# Patient Record
Sex: FEMALE | Race: BLACK OR AFRICAN AMERICAN | NOT HISPANIC OR LATINO | URBAN - METROPOLITAN AREA
[De-identification: names, ages, dates, MRNs, and addresses within clinical notes are randomized per-mention and may not be internally consistent; named-entity substitution may affect disease eponyms.]

---

## 2024-01-25 ENCOUNTER — HOSPITAL ENCOUNTER (INPATIENT)
Facility: HOSPITAL | Age: 79
LOS: 1 days | Discharge: HOME HEALTH | DRG: 311 | End: 2024-01-26
Attending: HOSPITALIST | Admitting: HOSPITALIST
Payer: MEDICARE

## 2024-01-25 DIAGNOSIS — I21.4 NSTEMI (NON-ST ELEVATED MYOCARDIAL INFARCTION): ICD-10-CM

## 2024-01-25 DIAGNOSIS — I44.1 AV BLOCK, MOBITZ 1: Primary | ICD-10-CM

## 2024-01-25 DIAGNOSIS — I45.9 HEART BLOCK: ICD-10-CM

## 2024-01-25 DIAGNOSIS — R07.9 CHEST PAIN: ICD-10-CM

## 2024-01-25 PROCEDURE — 11000001 HC ACUTE MED/SURG PRIVATE ROOM

## 2024-01-25 RX ORDER — GLUCAGON 1 MG
1 KIT INJECTION
Status: DISCONTINUED | OUTPATIENT
Start: 2024-01-25 | End: 2024-01-26 | Stop reason: HOSPADM

## 2024-01-25 RX ORDER — SODIUM CHLORIDE 0.9 % (FLUSH) 0.9 %
10 SYRINGE (ML) INJECTION EVERY 12 HOURS PRN
Status: DISCONTINUED | OUTPATIENT
Start: 2024-01-25 | End: 2024-01-26 | Stop reason: HOSPADM

## 2024-01-25 RX ORDER — IBUPROFEN 200 MG
16 TABLET ORAL
Status: DISCONTINUED | OUTPATIENT
Start: 2024-01-25 | End: 2024-01-26 | Stop reason: HOSPADM

## 2024-01-25 RX ORDER — MORPHINE SULFATE 2 MG/ML
2 INJECTION, SOLUTION INTRAMUSCULAR; INTRAVENOUS EVERY 4 HOURS PRN
Status: DISCONTINUED | OUTPATIENT
Start: 2024-01-25 | End: 2024-01-26 | Stop reason: HOSPADM

## 2024-01-25 RX ORDER — NALOXONE HCL 0.4 MG/ML
0.02 VIAL (ML) INJECTION
Status: DISCONTINUED | OUTPATIENT
Start: 2024-01-25 | End: 2024-01-26 | Stop reason: HOSPADM

## 2024-01-25 RX ORDER — IBUPROFEN 200 MG
24 TABLET ORAL
Status: DISCONTINUED | OUTPATIENT
Start: 2024-01-25 | End: 2024-01-26 | Stop reason: HOSPADM

## 2024-01-25 RX ORDER — ONDANSETRON HYDROCHLORIDE 2 MG/ML
4 INJECTION, SOLUTION INTRAVENOUS EVERY 8 HOURS PRN
Status: DISCONTINUED | OUTPATIENT
Start: 2024-01-25 | End: 2024-01-26 | Stop reason: HOSPADM

## 2024-01-25 RX ORDER — TALC
6 POWDER (GRAM) TOPICAL NIGHTLY PRN
Status: DISCONTINUED | OUTPATIENT
Start: 2024-01-25 | End: 2024-01-26 | Stop reason: HOSPADM

## 2024-01-26 VITALS
BODY MASS INDEX: 32.68 KG/M2 | SYSTOLIC BLOOD PRESSURE: 157 MMHG | HEIGHT: 60 IN | HEART RATE: 64 BPM | DIASTOLIC BLOOD PRESSURE: 70 MMHG | TEMPERATURE: 97 F | RESPIRATION RATE: 18 BRPM | OXYGEN SATURATION: 96 % | WEIGHT: 166.44 LBS

## 2024-01-26 DIAGNOSIS — Z00.00 ROUTINE ADULT HEALTH MAINTENANCE: ICD-10-CM

## 2024-01-26 DIAGNOSIS — Z76.89 ENCOUNTER TO ESTABLISH CARE: Primary | ICD-10-CM

## 2024-01-26 PROBLEM — N18.5 CKD (CHRONIC KIDNEY DISEASE) STAGE 5, GFR LESS THAN 15 ML/MIN: Status: ACTIVE | Noted: 2024-01-26

## 2024-01-26 PROBLEM — E78.5 HYPERLIPIDEMIA: Status: ACTIVE | Noted: 2024-01-26

## 2024-01-26 PROBLEM — R79.89 ELEVATED TROPONIN LEVEL: Status: ACTIVE | Noted: 2024-01-26

## 2024-01-26 PROBLEM — I10 HTN (HYPERTENSION): Status: ACTIVE | Noted: 2024-01-26

## 2024-01-26 PROBLEM — F03.90 DEMENTIA: Status: ACTIVE | Noted: 2024-01-26

## 2024-01-26 PROBLEM — I44.1 AV BLOCK, MOBITZ 1: Status: ACTIVE | Noted: 2024-01-26

## 2024-01-26 PROBLEM — M10.9 GOUT: Status: ACTIVE | Noted: 2024-01-26

## 2024-01-26 PROBLEM — F03.90 DEMENTIA: Status: RESOLVED | Noted: 2024-01-26 | Resolved: 2024-01-26

## 2024-01-26 LAB
ANION GAP SERPL CALC-SCNC: 12 MMOL/L (ref 8–16)
BASOPHILS # BLD AUTO: 0.02 K/UL (ref 0–0.2)
BASOPHILS NFR BLD: 0.3 % (ref 0–1.9)
BUN SERPL-MCNC: 52 MG/DL (ref 8–23)
CALCIUM SERPL-MCNC: 9.5 MG/DL (ref 8.7–10.5)
CHLORIDE SERPL-SCNC: 105 MMOL/L (ref 95–110)
CO2 SERPL-SCNC: 26 MMOL/L (ref 23–29)
CREAT SERPL-MCNC: 4 MG/DL (ref 0.5–1.4)
DIFFERENTIAL METHOD BLD: ABNORMAL
EOSINOPHIL # BLD AUTO: 0.1 K/UL (ref 0–0.5)
EOSINOPHIL NFR BLD: 1.7 % (ref 0–8)
ERYTHROCYTE [DISTWIDTH] IN BLOOD BY AUTOMATED COUNT: 14.9 % (ref 11.5–14.5)
EST. GFR  (NO RACE VARIABLE): 11 ML/MIN/1.73 M^2
GLUCOSE SERPL-MCNC: 124 MG/DL (ref 70–110)
HCT VFR BLD AUTO: 31.1 % (ref 37–48.5)
HGB BLD-MCNC: 10.3 G/DL (ref 12–16)
IMM GRANULOCYTES # BLD AUTO: 0.07 K/UL (ref 0–0.04)
IMM GRANULOCYTES NFR BLD AUTO: 0.9 % (ref 0–0.5)
LYMPHOCYTES # BLD AUTO: 1.3 K/UL (ref 1–4.8)
LYMPHOCYTES NFR BLD: 17.7 % (ref 18–48)
MCH RBC QN AUTO: 30.1 PG (ref 27–31)
MCHC RBC AUTO-ENTMCNC: 33.1 G/DL (ref 32–36)
MCV RBC AUTO: 91 FL (ref 82–98)
MONOCYTES # BLD AUTO: 0.7 K/UL (ref 0.3–1)
MONOCYTES NFR BLD: 9.9 % (ref 4–15)
NEUTROPHILS # BLD AUTO: 5.2 K/UL (ref 1.8–7.7)
NEUTROPHILS NFR BLD: 69.5 % (ref 38–73)
NRBC BLD-RTO: 0 /100 WBC
PLATELET # BLD AUTO: 194 K/UL (ref 150–450)
PMV BLD AUTO: 10.6 FL (ref 9.2–12.9)
POCT GLUCOSE: 111 MG/DL (ref 70–110)
POTASSIUM SERPL-SCNC: 4.2 MMOL/L (ref 3.5–5.1)
RBC # BLD AUTO: 3.42 M/UL (ref 4–5.4)
SODIUM SERPL-SCNC: 143 MMOL/L (ref 136–145)
TROPONIN I SERPL DL<=0.01 NG/ML-MCNC: 0.11 NG/ML (ref 0–0.03)
WBC # BLD AUTO: 7.51 K/UL (ref 3.9–12.7)

## 2024-01-26 PROCEDURE — 25000003 PHARM REV CODE 250: Performed by: STUDENT IN AN ORGANIZED HEALTH CARE EDUCATION/TRAINING PROGRAM

## 2024-01-26 PROCEDURE — 93005 ELECTROCARDIOGRAM TRACING: CPT

## 2024-01-26 PROCEDURE — 99223 1ST HOSP IP/OBS HIGH 75: CPT | Mod: ,,, | Performed by: STUDENT IN AN ORGANIZED HEALTH CARE EDUCATION/TRAINING PROGRAM

## 2024-01-26 PROCEDURE — 36415 COLL VENOUS BLD VENIPUNCTURE: CPT | Mod: XB | Performed by: NURSE PRACTITIONER

## 2024-01-26 PROCEDURE — 85025 COMPLETE CBC W/AUTO DIFF WBC: CPT | Performed by: STUDENT IN AN ORGANIZED HEALTH CARE EDUCATION/TRAINING PROGRAM

## 2024-01-26 PROCEDURE — 84484 ASSAY OF TROPONIN QUANT: CPT | Performed by: NURSE PRACTITIONER

## 2024-01-26 PROCEDURE — 36415 COLL VENOUS BLD VENIPUNCTURE: CPT | Performed by: STUDENT IN AN ORGANIZED HEALTH CARE EDUCATION/TRAINING PROGRAM

## 2024-01-26 PROCEDURE — 80048 BASIC METABOLIC PNL TOTAL CA: CPT | Performed by: STUDENT IN AN ORGANIZED HEALTH CARE EDUCATION/TRAINING PROGRAM

## 2024-01-26 PROCEDURE — 93010 ELECTROCARDIOGRAM REPORT: CPT | Mod: ,,, | Performed by: INTERNAL MEDICINE

## 2024-01-26 RX ORDER — MEMANTINE HYDROCHLORIDE 5 MG/1
10 TABLET ORAL DAILY
Status: DISCONTINUED | OUTPATIENT
Start: 2024-01-26 | End: 2024-01-26 | Stop reason: HOSPADM

## 2024-01-26 RX ORDER — SUCRALFATE 1 G/10ML
1 SUSPENSION ORAL EVERY 6 HOURS
Status: DISCONTINUED | OUTPATIENT
Start: 2024-01-26 | End: 2024-01-26 | Stop reason: HOSPADM

## 2024-01-26 RX ORDER — AMLODIPINE BESYLATE 5 MG/1
5 TABLET ORAL DAILY
Status: DISCONTINUED | OUTPATIENT
Start: 2024-01-26 | End: 2024-01-26 | Stop reason: HOSPADM

## 2024-01-26 RX ORDER — CETIRIZINE HYDROCHLORIDE 10 MG/1
10 TABLET ORAL EVERY MORNING
Status: DISCONTINUED | OUTPATIENT
Start: 2024-01-26 | End: 2024-01-26 | Stop reason: HOSPADM

## 2024-01-26 RX ORDER — BUMETANIDE 1 MG/1
1 TABLET ORAL DAILY
Status: DISCONTINUED | OUTPATIENT
Start: 2024-01-26 | End: 2024-01-26 | Stop reason: HOSPADM

## 2024-01-26 RX ORDER — ESCITALOPRAM OXALATE 5 MG/1
5 TABLET ORAL DAILY
Status: DISCONTINUED | OUTPATIENT
Start: 2024-01-26 | End: 2024-01-26 | Stop reason: HOSPADM

## 2024-01-26 RX ORDER — METOPROLOL SUCCINATE 50 MG/1
100 TABLET, EXTENDED RELEASE ORAL DAILY
Status: DISCONTINUED | OUTPATIENT
Start: 2024-01-26 | End: 2024-01-26

## 2024-01-26 RX ORDER — PANTOPRAZOLE SODIUM 40 MG/1
40 TABLET, DELAYED RELEASE ORAL DAILY
Status: DISCONTINUED | OUTPATIENT
Start: 2024-01-26 | End: 2024-01-26 | Stop reason: HOSPADM

## 2024-01-26 RX ORDER — ALLOPURINOL 100 MG/1
100 TABLET ORAL DAILY
Status: DISCONTINUED | OUTPATIENT
Start: 2024-01-26 | End: 2024-01-26 | Stop reason: HOSPADM

## 2024-01-26 RX ORDER — OXYBUTYNIN CHLORIDE 5 MG/1
10 TABLET, EXTENDED RELEASE ORAL DAILY
Status: DISCONTINUED | OUTPATIENT
Start: 2024-01-26 | End: 2024-01-26 | Stop reason: HOSPADM

## 2024-01-26 RX ORDER — ATORVASTATIN CALCIUM 40 MG/1
80 TABLET, FILM COATED ORAL DAILY
Status: DISCONTINUED | OUTPATIENT
Start: 2024-01-26 | End: 2024-01-26 | Stop reason: HOSPADM

## 2024-01-26 RX ORDER — ALUMINUM HYDROXIDE, MAGNESIUM HYDROXIDE, AND SIMETHICONE 1200; 120; 1200 MG/30ML; MG/30ML; MG/30ML
30 SUSPENSION ORAL
Status: DISCONTINUED | OUTPATIENT
Start: 2024-01-26 | End: 2024-01-26 | Stop reason: HOSPADM

## 2024-01-26 RX ADMIN — ALLOPURINOL 100 MG: 100 TABLET ORAL at 09:01

## 2024-01-26 RX ADMIN — CETIRIZINE HYDROCHLORIDE 10 MG: 10 TABLET, FILM COATED ORAL at 09:01

## 2024-01-26 RX ADMIN — METOPROLOL SUCCINATE 100 MG: 50 TABLET, EXTENDED RELEASE ORAL at 09:01

## 2024-01-26 RX ADMIN — MEMANTINE HYDROCHLORIDE 10 MG: 5 TABLET ORAL at 09:01

## 2024-01-26 RX ADMIN — OXYBUTYNIN CHLORIDE 10 MG: 5 TABLET, EXTENDED RELEASE ORAL at 09:01

## 2024-01-26 RX ADMIN — AMLODIPINE BESYLATE 5 MG: 5 TABLET ORAL at 09:01

## 2024-01-26 RX ADMIN — ATORVASTATIN CALCIUM 80 MG: 40 TABLET, FILM COATED ORAL at 09:01

## 2024-01-26 RX ADMIN — ESCITALOPRAM OXALATE 5 MG: 5 TABLET, FILM COATED ORAL at 09:01

## 2024-01-26 RX ADMIN — PANTOPRAZOLE SODIUM 40 MG: 40 TABLET, DELAYED RELEASE ORAL at 09:01

## 2024-01-26 RX ADMIN — BUMETANIDE 1 MG: 1 TABLET ORAL at 09:01

## 2024-01-26 NOTE — SUBJECTIVE & OBJECTIVE
Interval History:   No acute events  Denies cp/sob  No f/c/n/v  No complaints and eager to go home    Review of Systems   Constitutional:  Negative for chills and fever.   Respiratory:  Negative for cough, shortness of breath and wheezing.    Cardiovascular:  Negative for chest pain, palpitations and leg swelling.   Gastrointestinal:  Negative for abdominal pain, diarrhea and nausea.   Neurological:  Negative for dizziness, light-headedness and headaches.   Psychiatric/Behavioral:  Negative for confusion.      Objective:     Vital Signs (Most Recent):  Temp: 97 °F (36.1 °C) (01/26/24 1153)  Pulse: 64 (01/26/24 1153)  Resp: 18 (01/26/24 1153)  BP: (!) 157/70 (01/26/24 1153)  SpO2: 96 % (01/26/24 1153) Vital Signs (24h Range):  Temp:  [96.8 °F (36 °C)-98.1 °F (36.7 °C)] 97 °F (36.1 °C)  Pulse:  [60-64] 64  Resp:  [16-18] 18  SpO2:  [96 %-99 %] 96 %  BP: (157-181)/(70-77) 157/70     Weight: 75.5 kg (166 lb 7.2 oz)  Body mass index is 32.51 kg/m².    Intake/Output Summary (Last 24 hours) at 1/26/2024 1313  Last data filed at 1/26/2024 1247  Gross per 24 hour   Intake --   Output 1250 ml   Net -1250 ml         Physical Exam  Constitutional:       Appearance: Normal appearance.   HENT:      Mouth/Throat:      Mouth: Mucous membranes are moist.      Pharynx: Oropharynx is clear.   Eyes:      Conjunctiva/sclera: Conjunctivae normal.   Cardiovascular:      Rate and Rhythm: Normal rate and regular rhythm.      Pulses: Normal pulses.      Heart sounds: Normal heart sounds.   Pulmonary:      Effort: Pulmonary effort is normal.      Breath sounds: Normal breath sounds.   Abdominal:      Palpations: Abdomen is soft.   Musculoskeletal:         General: Normal range of motion.   Skin:     General: Skin is warm and dry.   Neurological:      General: No focal deficit present.      Mental Status: She is alert and oriented to person, place, and time.   Psychiatric:         Mood and Affect: Mood normal.         Behavior: Behavior  normal.             Significant Labs: All pertinent labs within the past 24 hours have been reviewed.    Significant Imaging: I have reviewed all pertinent imaging results/findings within the past 24 hours.

## 2024-01-26 NOTE — SUBJECTIVE & OBJECTIVE
No past medical history on file.    No past surgical history on file.    Review of patient's allergies indicates:   Allergen Reactions    Amoxicillin Rash    Ferrous sulfate Other (See Comments)     Constipation       No current facility-administered medications on file prior to encounter.     Current Outpatient Medications on File Prior to Encounter   Medication Sig    amLODIPine (NORVASC) 5 MG tablet Take 5 mg by mouth.    atorvastatin (LIPITOR) 80 MG tablet Take 1 tablet by mouth once daily.    bumetanide (BUMEX) 1 MG tablet Take 1 mg by mouth.    cetirizine (ZYRTEC) 10 MG tablet Take 1 tablet by mouth every morning.    ergocalciferol (ERGOCALCIFEROL) 50,000 unit Cap Take 50,000 Units by mouth.    EScitalopram oxalate (LEXAPRO) 5 MG Tab Take 1 tablet by mouth once daily.    febuxostat (ULORIC) 40 mg Tab Take 40 mg by mouth once daily.    hydrALAZINE (APRESOLINE) 25 MG tablet Take 75 mg by mouth.    memantine (NAMENDA) 10 MG Tab Take 10 mg by mouth.    metoprolol succinate (TOPROL-XL) 50 MG 24 hr tablet Take 2 tablets by mouth once daily.    omeprazole (PRILOSEC) 20 MG capsule Take 1 capsule by mouth once daily.    pediatric multivitamin chewable tablet Take 1 capsule by mouth once daily.    trospium (SANCTURA) 20 mg Tab tablet Take 20 mg by mouth 2 (two) times daily.     Family History    None       Tobacco Use    Smoking status: Not on file    Smokeless tobacco: Not on file   Substance and Sexual Activity    Alcohol use: Not on file    Drug use: Not on file    Sexual activity: Not on file     Review of Systems   Constitutional:  Negative for appetite change and chills.   HENT:  Negative for ear discharge and ear pain.    Respiratory:  Negative for cough and choking.    Cardiovascular:  Negative for chest pain and leg swelling.   Gastrointestinal:  Negative for anal bleeding and blood in stool.   Endocrine: Negative for polydipsia and polyphagia.   Genitourinary:  Negative for flank pain and hematuria.    Musculoskeletal:  Negative for back pain and gait problem.   Skin:  Negative for pallor and rash.   Allergic/Immunologic: Negative for food allergies and immunocompromised state.   Neurological:  Negative for seizures and speech difficulty.   Hematological:  Negative for adenopathy. Does not bruise/bleed easily.   Psychiatric/Behavioral:  Negative for decreased concentration and dysphoric mood.      Objective:     Vital Signs (Most Recent):  Temp: 97.5 °F (36.4 °C) (01/25/24 2225)  Pulse: 63 (01/25/24 2358)  Resp: 16 (01/25/24 2225)  BP: (!) 168/71 (01/25/24 2225)  SpO2: 98 % (01/25/24 2225) Vital Signs (24h Range):  Temp:  [97.5 °F (36.4 °C)] 97.5 °F (36.4 °C)  Pulse:  [62-63] 63  Resp:  [16] 16  SpO2:  [98 %] 98 %  BP: (168)/(71) 168/71     Weight: 74.4 kg (164 lb 0.4 oz)  Body mass index is 32.03 kg/m².     Physical Exam  Vitals reviewed.   Constitutional:       General: She is not in acute distress.     Appearance: She is obese. She is not toxic-appearing.   HENT:      Right Ear: There is no impacted cerumen.      Left Ear: There is no impacted cerumen.      Nose: No congestion or rhinorrhea.      Mouth/Throat:      Pharynx: No oropharyngeal exudate or posterior oropharyngeal erythema.   Eyes:      General:         Right eye: No discharge.         Left eye: No discharge.   Cardiovascular:      Rate and Rhythm: Normal rate.      Heart sounds: No murmur heard.     No gallop.   Pulmonary:      Breath sounds: No wheezing or rales.   Abdominal:      General: There is no distension.      Tenderness: There is no abdominal tenderness.   Musculoskeletal:         General: No swelling or deformity.      Cervical back: No rigidity.      Comments: Fistula seen    Lymphadenopathy:      Cervical: No cervical adenopathy.   Skin:     Coloration: Skin is not jaundiced.      Findings: No bruising.   Neurological:      General: No focal deficit present.      Cranial Nerves: No cranial nerve deficit.      Motor: No weakness.  "  Psychiatric:         Mood and Affect: Mood normal.                Significant Labs: All pertinent labs within the past 24 hours have been reviewed.  BMP: No results for input(s): "GLU", "NA", "K", "CL", "CO2", "BUN", "CREATININE", "CALCIUM", "MG" in the last 48 hours.  CBC: No results for input(s): "WBC", "HGB", "HCT", "PLT" in the last 48 hours.  CMP: No results for input(s): "NA", "K", "CL", "CO2", "GLU", "BUN", "CREATININE", "CALCIUM", "PROT", "ALBUMIN", "BILITOT", "ALKPHOS", "AST", "ALT", "ANIONGAP", "EGFRNONAA" in the last 48 hours.    Invalid input(s): "ESTGFAFRICA"  Troponin: No results for input(s): "TROPONINI", "TROPONINIHS" in the last 48 hours.  TSH:   Recent Labs   Lab 09/20/23  1451   TSH 0.85     Urine Culture: No results for input(s): "LABURIN" in the last 48 hours.    Significant Imaging: I have reviewed all pertinent imaging results/findings within the past 24 hours.  I have reviewed and interpreted all pertinent imaging results/findings within the past 24 hours.  "

## 2024-01-26 NOTE — SUBJECTIVE & OBJECTIVE
No past medical history on file.    No past surgical history on file.    Review of patient's allergies indicates:   Allergen Reactions    Amoxicillin Rash    Ferrous sulfate Other (See Comments)     Constipation       No current facility-administered medications on file prior to encounter.     Current Outpatient Medications on File Prior to Encounter   Medication Sig    amLODIPine (NORVASC) 5 MG tablet Take 5 mg by mouth.    atorvastatin (LIPITOR) 80 MG tablet Take 1 tablet by mouth once daily.    bumetanide (BUMEX) 1 MG tablet Take 1 mg by mouth.    cetirizine (ZYRTEC) 10 MG tablet Take 1 tablet by mouth every morning.    ergocalciferol (ERGOCALCIFEROL) 50,000 unit Cap Take 50,000 Units by mouth.    EScitalopram oxalate (LEXAPRO) 5 MG Tab Take 1 tablet by mouth once daily.    febuxostat (ULORIC) 40 mg Tab Take 40 mg by mouth once daily.    hydrALAZINE (APRESOLINE) 25 MG tablet Take 75 mg by mouth.    memantine (NAMENDA) 10 MG Tab Take 10 mg by mouth.    metoprolol succinate (TOPROL-XL) 50 MG 24 hr tablet Take 2 tablets by mouth once daily.    omeprazole (PRILOSEC) 20 MG capsule Take 1 capsule by mouth once daily.    pediatric multivitamin chewable tablet Take 1 capsule by mouth once daily.    trospium (SANCTURA) 20 mg Tab tablet Take 20 mg by mouth 2 (two) times daily.     Family History    None       Tobacco Use    Smoking status: Not on file    Smokeless tobacco: Not on file   Substance and Sexual Activity    Alcohol use: Not on file    Drug use: Not on file    Sexual activity: Not on file     Review of Systems   Constitutional: Negative. Negative for diaphoresis.   HENT: Negative.     Eyes: Negative.    Cardiovascular:  Positive for chest pain (x 1 mo, resolved PTA). Negative for irregular heartbeat, leg swelling, near-syncope, orthopnea, palpitations, paroxysmal nocturnal dyspnea and syncope.   Respiratory: Negative.  Negative for cough and shortness of breath.    Endocrine: Negative.    Hematologic/Lymphatic:  Negative.    Gastrointestinal:  Negative for nausea and vomiting.   Genitourinary: Negative.    Neurological:  Positive for weakness.   Psychiatric/Behavioral:  Positive for memory loss.    Allergic/Immunologic: Negative.      Objective:     Vital Signs (Most Recent):  Temp: 96.8 °F (36 °C) (01/26/24 0751)  Pulse: 62 (01/26/24 0751)  Resp: 18 (01/26/24 0751)  BP: (!) 181/77 (01/26/24 0751)  SpO2: 96 % (01/26/24 0751) Vital Signs (24h Range):  Temp:  [96.8 °F (36 °C)-98.1 °F (36.7 °C)] 96.8 °F (36 °C)  Pulse:  [60-63] 62  Resp:  [16-18] 18  SpO2:  [96 %-99 %] 96 %  BP: (168-181)/(71-77) 181/77     Weight: 75.5 kg (166 lb 7.2 oz)  Body mass index is 32.51 kg/m².    SpO2: 96 %         Intake/Output Summary (Last 24 hours) at 1/26/2024 1006  Last data filed at 1/26/2024 0520  Gross per 24 hour   Intake --   Output 500 ml   Net -500 ml       Lines/Drains/Airways       None                    Physical Exam  Constitutional:       General: She is not in acute distress.     Appearance: She is not diaphoretic.   HENT:      Head: Atraumatic.   Eyes:      General:         Right eye: No discharge.         Left eye: No discharge.   Cardiovascular:      Rate and Rhythm: Bradycardia present.   Pulmonary:      Effort: Pulmonary effort is normal.      Breath sounds: No rales.   Abdominal:      General: Bowel sounds are normal.      Palpations: Abdomen is soft.   Musculoskeletal:      Right lower leg: No edema.      Left lower leg: No edema.   Skin:     General: Skin is warm and dry.      Capillary Refill: Capillary refill takes 2 to 3 seconds.   Neurological:      Mental Status: She is alert. Mental status is at baseline.          Significant Labs: BMP:   Recent Labs   Lab 01/26/24  0247   *      K 4.2      CO2 26   BUN 52*   CREATININE 4.0*   CALCIUM 9.5   , CMP   Recent Labs   Lab 01/26/24  0247      K 4.2      CO2 26   *   BUN 52*   CREATININE 4.0*   CALCIUM 9.5   ANIONGAP 12   , CBC   Recent  "Labs   Lab 01/26/24  0247   WBC 7.51   HGB 10.3*   HCT 31.1*      , INR No results for input(s): "INR", "PROTIME" in the last 48 hours., Lipid Panel No results for input(s): "CHOL", "HDL", "LDLCALC", "TRIG", "CHOLHDL" in the last 48 hours., Troponin   Recent Labs   Lab 01/26/24  0744   TROPONINI 0.112*   , and All pertinent lab results from the last 24 hours have been reviewed.    Significant Imaging: Echocardiogram: Transthoracic echo (TTE) complete (Cupid Only): No results found for this or any previous visit.  "

## 2024-01-26 NOTE — CONSULTS
[facility] - Telemetry  Cardiology  Consult Note    Patient Name: Kay Ahumada  MRN: [id number]  Admission Date: 1/25/2024  Hospital Length of Stay: 1 days  Code Status: Full Code   Attending Provider: Juliano Parson MD   Consulting Provider: Charly Mckeon NP  Primary Care Physician: Juliano Parson MD  Principal Problem:Elevated troponin level    Patient information was obtained from patient and ER records.     Inpatient consult to [facility]iology[facility]  Consult performed by: Charly Mckeon NP  Consult ordered by: Bruce Rivera MD        Subjective:     Chief Complaint:  weakness, fatigue, UTI     HPI:   Kay Ahumada is a 78 yr old female with  CKD, hypertension, type 2 diabetes that presented to an outside facility with generalized weakness fatigue on 01/22/2024. Patient also reports CP x 1 month. She is a poor historian and is unable to elaborate. Per  her HS troponin was in the 100s which converts to T of 0.1. EKG was SR without acute ischemic changes. TTE with normal LVEF and no significant WMA. PFO noted without any pulmonary venous flow. Repeat troponin 0.1 this AM. Cr 4 up from 3.5- she has not been declared ESRD. Patient noted to have UTI. Outside records are not available. Patient was transferred to [facility] for cardiology and nephrology consultation. She denies any chest pain at this time.     No past medical history on file.    No past surgical history on file.    Review of patient's allergies indicates:   Allergen Reactions    Amoxicillin Rash    Ferrous sulfate Other (See Comments)     Constipation       No current facility-administered medications on file prior to encounter.     Current Outpatient Medications on File Prior to Encounter   Medication Sig    amLODIPine (NORVASC) 5 MG tablet Take 5 mg by mouth.    atorvastatin (LIPITOR) 80 MG tablet Take 1 tablet by mouth once daily.    bumetanide (BUMEX) 1 MG tablet Take 1 mg by mouth.    cetirizine (ZYRTEC) 10 MG tablet Take 1  tablet by mouth every morning.    ergocalciferol (ERGOCALCIFEROL) 50,000 unit Cap Take 50,000 Units by mouth.    EScitalopram oxalate (LEXAPRO) 5 MG Tab Take 1 tablet by mouth once daily.    febuxostat (ULORIC) 40 mg Tab Take 40 mg by mouth once daily.    hydrALAZINE (APRESOLINE) 25 MG tablet Take 75 mg by mouth.    memantine (NAMENDA) 10 MG Tab Take 10 mg by mouth.    metoprolol succinate (TOPROL-XL) 50 MG 24 hr tablet Take 2 tablets by mouth once daily.    omeprazole (PRILOSEC) 20 MG capsule Take 1 capsule by mouth once daily.    pediatric multivitamin chewable tablet Take 1 capsule by mouth once daily.    trospium (SANCTURA) 20 mg Tab tablet Take 20 mg by mouth 2 (two) times daily.     Family History    None       Tobacco Use    Smoking status: Not on file    Smokeless tobacco: Not on file   Substance and Sexual Activity    Alcohol use: Not on file    Drug use: Not on file    Sexual activity: Not on file     Review of Systems   Constitutional: Negative. Negative for diaphoresis.   HENT: Negative.     Eyes: Negative.    Cardiovascular:  Positive for chest pain (x 1 mo, resolved PTA). Negative for irregular heartbeat, leg swelling, near-syncope, orthopnea, palpitations, paroxysmal nocturnal dyspnea and syncope.   Respiratory: Negative.  Negative for cough and shortness of breath.    Endocrine: Negative.    Hematologic/Lymphatic: Negative.    Gastrointestinal:  Negative for nausea and vomiting.   Genitourinary: Negative.    Neurological:  Positive for weakness.   Psychiatric/Behavioral:  Positive for memory loss.    Allergic/Immunologic: Negative.      Objective:     Vital Signs (Most Recent):  Temp: 96.8 °F (36 °C) (01/26/24 0751)  Pulse: 62 (01/26/24 0751)  Resp: 18 (01/26/24 0751)  BP: (!) 181/77 (01/26/24 0751)  SpO2: 96 % (01/26/24 0751) Vital Signs (24h Range):  Temp:  [96.8 °F (36 °C)-98.1 °F (36.7 °C)] 96.8 °F (36 °C)  Pulse:  [60-63] 62  Resp:  [16-18] 18  SpO2:  [96 %-99 %] 96 %  BP: (168-181)/(71-77)  "181/77     Weight: 75.5 kg (166 lb 7.2 oz)  Body mass index is 32.51 kg/m².    SpO2: 96 %         Intake/Output Summary (Last 24 hours) at 1/26/2024 1006  Last data filed at 1/26/2024 0520  Gross per 24 hour   Intake --   Output 500 ml   Net -500 ml       Lines/Drains/Airways       None                    Physical Exam  Constitutional:       General: She is not in acute distress.     Appearance: She is not diaphoretic.   HENT:      Head: Atraumatic.   Eyes:      General:         Right eye: No discharge.         Left eye: No discharge.   Cardiovascular:      Rate and Rhythm: Bradycardia present.   Pulmonary:      Effort: Pulmonary effort is normal.      Breath sounds: No rales.   Abdominal:      General: Bowel sounds are normal.      Palpations: Abdomen is soft.   Musculoskeletal:      Right lower leg: No edema.      Left lower leg: No edema.   Skin:     General: Skin is warm and dry.      Capillary Refill: Capillary refill takes 2 to 3 seconds.   Neurological:      Mental Status: She is alert. Mental status is at baseline.          Significant Labs: BMP:   Recent Labs   Lab 01/26/24  0247   *      K 4.2      CO2 26   BUN 52*   CREATININE 4.0*   CALCIUM 9.5   , CMP   Recent Labs   Lab 01/26/24  0247      K 4.2      CO2 26   *   BUN 52*   CREATININE 4.0*   CALCIUM 9.5   ANIONGAP 12   , CBC   Recent Labs   Lab 01/26/24  0247   WBC 7.51   HGB 10.3*   HCT 31.1*      , INR No results for input(s): "INR", "PROTIME" in the last 48 hours., Lipid Panel No results for input(s): "CHOL", "HDL", "LDLCALC", "TRIG", "CHOLHDL" in the last 48 hours., Troponin   Recent Labs   Lab 01/26/24  0744   TROPONINI 0.112*   , and All pertinent lab results from the last 24 hours have been reviewed.    Significant Imaging: Echocardiogram: Transthoracic echo (TTE) complete (Cupid Only): No results found for this or any previous visit.  Assessment and Plan:     * Elevated troponin level  Troponin 0.1 x " 2- flat non ACS trend  EKG SR without acute ischemic changes  TTE with normal LVEF and no WMA  No CP  Felt to be demand in setting of UTI and NADYA on CKD with Cr up to 4 this AM and poorly controlled HTN  Feel no further inpatient work up indicated at this time  Follow up with primary cardiologist     AV block, Mobitz 1  Patient with intermittent Mobitz 1 on tele  DCd BB  30 day event monitor with primary cardiologist as OP     HTN (hypertension)  SBP elevated  Continue Norvasc and up titrate PRN  BB discontinued- Mobitz 1 on tele review     Hyperlipidemia  Continue high intensity statin     CKD (chronic kidney disease) stage 5, GFR less than 15 ml/min  Cr up to 4 from 3.5 yesterday   Nephrology consult pending   Patient has AVF - has not been declared ESRD at this time          VTE Risk Mitigation (From admission, onward)           Ordered     IP VTE LOW RISK PATIENT  Once         01/25/24 2203     Place sequential compression device  Until discontinued         01/25/24 2203                    Thank you for your consult. I will sign off. Please contact us if you have any additional questions.    Charly Mckeon NP  Cardiology   [facility] - Telemetry

## 2024-01-26 NOTE — ASSESSMENT & PLAN NOTE
Patient noted to have significant Troponinemia at outside facility.  EKG with normal sinus rhythm  TTE showed LVEF 55-60%    Plan:  Cardiology consultation   NPO   Possible angiogram in the a.m.

## 2024-01-26 NOTE — HPI
Kay Ahumada is a 78 yr old female with PMH of of CKD, hypertension, type 2 diabetes that presented with generalized weakness fatigue to an outside hospital, found to have significantly elevated troponinemia and transferred to [facility] for cardiology and nephrology consultation.    Patient was noted to have significant troponinemia, and patient was started on heparin drip.  Patient was transitioned to Lovenox.  TTE performed showed preserved LVEF 55-60%.  Patient denies any current chest pain.  EKG with normal sinus rhythm.  Cardiology has recommended angiogram for consideration of HD.     At the outside facility, patient was found to have UTI, and she was started on Rocephin. She has completed 2-3 days. No current complaints of dysuria.    Patient has a functioning left upper extremity fistula.  She was yet to start dialysis with it.    Pt admitted for further workup.

## 2024-01-26 NOTE — PLAN OF CARE
Problem: Adult Inpatient Plan of Care  Goal: Plan of Care Review  Outcome: Ongoing, Progressing  Goal: Absence of Hospital-Acquired Illness or Injury  Outcome: Ongoing, Progressing     Problem: Skin Injury Risk Increased  Goal: Skin Health and Integrity  Outcome: Ongoing, Progressing     Problem: Fall Injury Risk  Goal: Absence of Fall and Fall-Related Injury  Outcome: Ongoing, Progressing     Problem: Renal Function Impairment (Chronic Kidney Disease)  Goal: Minimize Renal Failure Effects  Outcome: Ongoing, Progressing

## 2024-01-26 NOTE — ASSESSMENT & PLAN NOTE
Chronic, controlled. Latest blood pressure and vitals reviewed-     Temp:  [96.8 °F (36 °C)-98.1 °F (36.7 °C)]   Pulse:  [60-64]   Resp:  [16-18]   BP: (157-181)/(70-77)   SpO2:  [96 %-99 %] .   Home meds for hypertension were reviewed and noted below.   Hypertension Medications               amLODIPine (NORVASC) 5 MG tablet Take 5 mg by mouth.    bumetanide (BUMEX) 1 MG tablet Take 1 mg by mouth.    hydrALAZINE (APRESOLINE) 25 MG tablet Take 75 mg by mouth.    metoprolol succinate (TOPROL-XL) 50 MG 24 hr tablet Take 2 tablets by mouth once daily.            While in the hospital, will manage blood pressure as follows; Continue home antihypertensive regimen    Will utilize p.r.n. blood pressure medication only if patient's blood pressure greater than 180/110 and she develops symptoms such as worsening chest pain or shortness of breath.

## 2024-01-26 NOTE — ASSESSMENT & PLAN NOTE
Patient with intermittent Mobitz 1 on tele  DCd BB  30 day event monitor with primary cardiologist as OP

## 2024-01-26 NOTE — ASSESSMENT & PLAN NOTE
Cr up to 4 from 3.5 yesterday   Nephrology consult pending   Patient has AVF - has not been declared ESRD at this time

## 2024-01-26 NOTE — ASSESSMENT & PLAN NOTE
"Patient noted to have significant Troponinemia at outside facility.  EKG with normal sinus rhythm  TTE showed LVEF 55-60%    Plan:  Cardiology consultation: "[place] to be demand in setting of UTI and NADYA on CKD with Cr up to 4 this AM and poorly controlled HTN  Feel no further inpatient work up indicated at this time  Follow up with primary cardiologist "    Patient was discharged home in stable condition    "

## 2024-01-26 NOTE — PROGRESS NOTES
VN cued into room to complete admit assessment. VIP model introduced; VN working alongside bedside treatment team.  Plan of care reviewed with patient. Patient informed of fall risk, fall precautions, call light within reach, side rails x2 elevated. Patient notified to ask staff for assistance. Patient verbalized complete understanding. Time allowed for questions. Will continue to monitor and intervene as needed.   01/26/24 0107   Admission   Initial VN Admission Questions Complete   Communication Issues? None   Shift   Pain Management Interventions quiet environment facilitated;relaxation techniques promoted   Virtual Nurse - Patient Verbalized Approval Of Camera Use   Type of Frequent Check   Type Patient Rounds;Telemetry Monitoring   Safety/Activity   Patient Rounds bed in low position;placement of personal items at bedside;bed wheels locked   Safety Promotion/Fall Prevention assistive device/personal item within reach;bed alarm set;Fall Risk reviewed with patient/family;high risk medications identified;side rails raised x 2;nonskid shoes/socks when out of bed   Safety Precautions emergency equipment at bedside   Activity Management Ambulated -L4   Activity Assistance Provided assistance, stand-by   Positioning   Body Position position changed independently   Head of Bed (HOB) Positioning HOB elevated   Positioning/Transfer Devices pillows;in use

## 2024-01-26 NOTE — CONSULTS
NEPHROLOGY CONSULT NOTE    HPI & INTERVAL HISTORY:    No past medical history on file.   No past surgical history on file.   Review of patient's allergies indicates:   Allergen Reactions    Amoxicillin Rash    Ferrous sulfate Other (See Comments)     Constipation      Medications Prior to Admission   Medication Sig Dispense Refill Last Dose    amLODIPine (NORVASC) 5 MG tablet Take 5 mg by mouth.   Past Week    atorvastatin (LIPITOR) 80 MG tablet Take 1 tablet by mouth once daily.   Past Week    bumetanide (BUMEX) 1 MG tablet Take 1 mg by mouth.   Past Week    cetirizine (ZYRTEC) 10 MG tablet Take 1 tablet by mouth every morning.   Past Week    ergocalciferol (ERGOCALCIFEROL) 50,000 unit Cap Take 50,000 Units by mouth.   Past Week    EScitalopram oxalate (LEXAPRO) 5 MG Tab Take 1 tablet by mouth once daily.   Past Week    febuxostat (ULORIC) 40 mg Tab Take 40 mg by mouth once daily.   Past Week    hydrALAZINE (APRESOLINE) 25 MG tablet Take 75 mg by mouth.   Past Week    memantine (NAMENDA) 10 MG Tab Take 10 mg by mouth.   Past Week    omeprazole (PRILOSEC) 20 MG capsule Take 1 capsule by mouth once daily.   Past Week    pediatric multivitamin chewable tablet Take 1 capsule by mouth once daily.   Unknown    trospium (SANCTURA) 20 mg Tab tablet Take 20 mg by mouth 2 (two) times daily.          Social History     Socioeconomic History    Marital status:      Social Determinants of Health     Food Insecurity: No Food Insecurity (1/26/2024)    Hunger Vital Sign     Worried About Running Out of Food in the Last Year: Never true     Ran Out of Food in the Last Year: Never true   Transportation Needs: No Transportation Needs (1/26/2024)    PRAPARE - Transportation     Lack of Transportation (Medical): No     Lack of Transportation (Non-Medical): No   Housing Stability: Unknown (1/26/2024)    Housing Stability Vital Sign     Unable to Pay for Housing in the Last Year: No     Unstable Housing in the Last Year: No     "    MEDS   allopurinoL  100 mg Oral Daily    aluminum-magnesium hydroxide-simethicone  30 mL Oral QID (AC & HS)    amLODIPine  5 mg Oral Daily    atorvastatin  80 mg Oral Daily    bumetanide  1 mg Oral Daily    cetirizine  10 mg Oral QAM    EScitalopram oxalate  5 mg Oral Daily    memantine  10 mg Oral Daily    oxybutynin  10 mg Oral Daily    pantoprazole  40 mg Oral Daily    sucralfate  1 g Oral Q6H           CONTINOUS INFUSIONS:      Intake/Output Summary (Last 24 hours) at 1/26/2024 1450  Last data filed at 1/26/2024 1247  Gross per 24 hour   Intake --   Output 1250 ml   Net -1250 ml        HEMODYNAMICS:    Temp:  [96.8 °F (36 °C)-98.1 °F (36.7 °C)] 97 °F (36.1 °C)  Pulse:  [60-64] 64  Resp:  [16-18] 18  SpO2:  [96 %-99 %] 96 %  BP: (157-181)/(70-77) 157/70   General :    No SOB   No cough   No CP   No nausea   No vomiting   No diarrhea  No fever  No chills  No abdominal pain  No back pain  Cardiology : pulse 60  Pulmonary : diminished breath sounds  Abdomen : soft  Extremities : Edema  No asterixis  LABS   Lab Results   Component Value Date    WBC 7.51 01/26/2024    HGB 10.3 (L) 01/26/2024    HCT 31.1 (L) 01/26/2024    MCV 91 01/26/2024     01/26/2024        Recent Labs   Lab 01/26/24  0247   *   CALCIUM 9.5      K 4.2   CO2 26      BUN 52*   CREATININE 4.0*      Lab Results   Component Value Date    CALCIUM 9.5 01/26/2024      No results found for: "IRON", "TIBC", "FERRITIN"     ABG  No results for input(s): "PH", "PO2", "PCO2", "HCO3", "BE" in the last 168 hours.      IMAGING:  CXR    ASSESSMENT / PLAN  CKD 5  Left arm AV fistula  Creatinine 4  GFR 11cc / min  BUN 52  Metabolic boned disease  Anemia multifactorial  Hb 10.3  Hypertension  Blood pressure 157/70  Pulse oximeter 96% O2  Weight daily   I and O  Renal diet  Follow up with nephrologist  "

## 2024-01-26 NOTE — PLAN OF CARE
[facility] - Telemetry      HOME HEALTH ORDERS  FACE TO FACE ENCOUNTER    Patient Name: Kay Ahumada  YOB: 1945    PCP: Minor Farias MD   PCP Address: [address] [place] [address] / [place] [address]  PCP Phone Number: [phone]  PCP Fax: [phone]    Encounter Date: 1/25/24    Admit to Home Health    Diagnoses:  Active Hospital Problems    Diagnosis  POA    *Elevated troponin level [R79.89]  Yes    CKD (chronic kidney disease) stage 5, GFR less than 15 ml/min [N18.5]  Yes    Hyperlipidemia [E78.5]  Yes    HTN (hypertension) [I10]  Yes    Gout [M10.9]  Yes    AV block, Mobitz 1 [I44.1]  Unknown      Resolved Hospital Problems    Diagnosis Date Resolved POA    Dementia [F03.90] 01/26/2024 Yes       Follow Up Appointments:  No future appointments.    Allergies:  Review of patient's allergies indicates:   Allergen Reactions    Amoxicillin Rash    Ferrous sulfate Other (See Comments)     Constipation       Medications: Review discharge medications with patient and family and provide education.    Current Facility-Administered Medications   Medication Dose Route Frequency Provider Last Rate Last Admin    allopurinoL tablet 100 mg  100 mg Oral Daily Bruce Rivera MD   100 mg at 01/26/24 0918    aluminum-magnesium hydroxide-simethicone 200-200-20 mg/5 mL suspension 30 mL  30 mL Oral QID (AC & HS) Bruce Rivera MD        amLODIPine tablet 5 mg  5 mg Oral Daily Bruce Rivera MD   5 mg at 01/26/24 0918    atorvastatin tablet 80 mg  80 mg Oral Daily Bruce Rivera MD   80 mg at 01/26/24 0918    bumetanide tablet 1 mg  1 mg Oral Daily Bruce Rivera MD   1 mg at 01/26/24 0918    cetirizine tablet 10 mg  10 mg Oral QAM Bruce Rivera MD   10 mg at 01/26/24 0919    dextrose 10% bolus 125 mL 125 mL  12.5 g Intravenous PRN Bruce Rivera MD        dextrose 10% bolus 250 mL 250 mL  25 g Intravenous PRN Bruce Rivera MD        EScitalopram oxalate tablet 5 mg  5  mg Oral Daily Bruce Rivera MD   5 mg at 01/26/24 0918    glucagon (human recombinant) injection 1 mg  1 mg Intramuscular PRN Bruce Rivera MD        glucose chewable tablet 16 g  16 g Oral PRN Bruce Rivera MD        glucose chewable tablet 24 g  24 g Oral PRN Bruce Rivera MD        melatonin tablet 6 mg  6 mg Oral Nightly PRN Bruce Rivera MD        memantine tablet 10 mg  10 mg Oral Daily Bruce Rivera MD   10 mg at 01/26/24 0918    morphine injection 2 mg  2 mg Intravenous Q4H PRN Bruce Rivera MD        naloxone 0.4 mg/mL injection 0.02 mg  0.02 mg Intravenous PRN Bruce Rivera MD        ondansetron injection 4 mg  4 mg Intravenous Q8H PRN Bruce Rivera MD        oxybutynin 24 hr tablet 10 mg  10 mg Oral Daily Bruce Rivera MD   10 mg at 01/26/24 0918    pantoprazole EC tablet 40 mg  40 mg Oral Daily Bruce Rivera MD   40 mg at 01/26/24 0918    sodium chloride 0.9% flush 10 mL  10 mL Intravenous Q12H PRN Bruce Rivera MD        sucralfate 100 mg/mL suspension 1 g  1 g Oral Q6H Bruce Rivera MD         Current Discharge Medication List        CONTINUE these medications which have NOT CHANGED    Details   amLODIPine (NORVASC) 5 MG tablet Take 5 mg by mouth.      atorvastatin (LIPITOR) 80 MG tablet Take 1 tablet by mouth once daily.      bumetanide (BUMEX) 1 MG tablet Take 1 mg by mouth.      cetirizine (ZYRTEC) 10 MG tablet Take 1 tablet by mouth every morning.      ergocalciferol (ERGOCALCIFEROL) 50,000 unit Cap Take 50,000 Units by mouth.      EScitalopram oxalate (LEXAPRO) 5 MG Tab Take 1 tablet by mouth once daily.      febuxostat (ULORIC) 40 mg Tab Take 40 mg by mouth once daily.      hydrALAZINE (APRESOLINE) 25 MG tablet Take 75 mg by mouth.      memantine (NAMENDA) 10 MG Tab Take 10 mg by mouth.      omeprazole (PRILOSEC) 20 MG capsule Take 1 capsule by mouth once daily.      pediatric multivitamin  chewable tablet Take 1 capsule by mouth once daily.      trospium (SANCTURA) 20 mg Tab tablet Take 20 mg by mouth 2 (two) times daily.               I have seen and examined this patient within the last 30 days. My clinical findings that support the need for the home health skilled services and home bound status are the following:no   Weakness/numbness causing balance and gait disturbance due to Weakness/Debility making it taxing to leave home.     Diet:   diabetic diet 1800 calorie    Labs:  Report Lab results to PCP.    Referrals/ Consults  Physical Therapy to evaluate and treat. Evaluate for home safety and equipment needs; Establish/upgrade home exercise program. Perform / instruct on therapeutic exercises, gait training, transfer training, and Range of Motion.  Occupational Therapy to evaluate and treat. Evaluate home environment for safety and equipment needs. Perform/Instruct on transfers, ADL training, ROM, and therapeutic exercises.   to evaluate for community resources/long-range planning.    Activities:   activity as tolerated    Nursing:   Agency to admit patient within 24 hours of hospital discharge unless specified on physician order or at patient request    SN to complete comprehensive assessment including routine vital signs. Instruct on disease process and s/s of complications to report to MD. Review/verify medication list sent home with the patient at time of discharge  and instruct patient/caregiver as needed. Frequency may be adjusted depending on start of care date.     Skilled nurse to perform up to 3 visits PRN for symptoms related to diagnosis    Notify MD if SBP > 160 or < 90; DBP > 90 or < 50; HR > 120 or < 50; Temp > 101; O2 < 88%; Other:       Ok to schedule additional visits based on staff availability and patient request on consecutive days within the home health episode.    When multiple disciplines ordered:    Start of Care occurs on Sunday - Wednesday schedule remaining  discipline evaluations as ordered on separate consecutive days following the start of care.    Thursday SOC -schedule subsequent evaluations Friday and Monday the following week.     Friday - Saturday SOC - schedule subsequent discipline evaluations on consecutive days starting Monday of the following week.    For all post-discharge communication and subsequent orders please contact patient's primary care physician.     Miscellaneous   30 DAY EVENT MONITOR      I certify that this patient is confined to her home and needs physical therapy and occupational therapy.

## 2024-01-26 NOTE — ASSESSMENT & PLAN NOTE
Chronic, controlled. Latest blood pressure and vitals reviewed-     Temp:  [97.5 °F (36.4 °C)]   Pulse:  [62-63]   Resp:  [16]   BP: (168)/(71)   SpO2:  [98 %] .   Home meds for hypertension were reviewed and noted below.   Hypertension Medications               amLODIPine (NORVASC) 5 MG tablet Take 5 mg by mouth.    bumetanide (BUMEX) 1 MG tablet Take 1 mg by mouth.    hydrALAZINE (APRESOLINE) 25 MG tablet Take 75 mg by mouth.    metoprolol succinate (TOPROL-XL) 50 MG 24 hr tablet Take 2 tablets by mouth once daily.            While in the hospital, will manage blood pressure as follows; Continue home antihypertensive regimen    Will utilize p.r.n. blood pressure medication only if patient's blood pressure greater than 180/110 and she develops symptoms such as worsening chest pain or shortness of breath.

## 2024-01-26 NOTE — ASSESSMENT & PLAN NOTE
Troponin 0.1 x 2- flat non ACS trend  EKG SR without acute ischemic changes  TTE with normal LVEF and no WMA  No CP  Felt to be demand in setting of UTI and NADYA on CKD with Cr up to 4 this AM and poorly controlled HTN  Feel no further inpatient work up indicated at this time  Follow up with primary cardiologist

## 2024-01-26 NOTE — ASSESSMENT & PLAN NOTE
Creatine stable for now. BMP reviewed- noted CrCl cannot be calculated (Patient's most recent lab result is older than the maximum 7 days allowed.). according to latest data. Based on current GFR, CKD stage is stage 5 - GFR < 15.  Monitor UOP and serial BMP and adjust therapy as needed. Renally dose meds. Avoid nephrotoxic medications and procedures.

## 2024-01-26 NOTE — PLAN OF CARE
[facility] - Telemetry  Initial Discharge Assessment       Primary Care Provider: Minor Farias MD    Admission Diagnosis: NSTEMI (non-ST elevated myocardial infarction) [I21.4]    Admission Date: 1/25/2024  Expected Discharge Date: 1/26/2024    Pharmacist will go over home medications and reasons for medications. VN and bedside nurse to reiterate final discharge instructions.     Cleared from CM . Bedside Nurse and VN notified.    Family to transport to home. [facility] pt was current with it. Orders faxed. F/U with PCP and Cards.    Transition of Care Barriers: None    Payor: MEDICARE / Plan: MEDICARE PART A & B / Product Type: Government /     Extended Emergency Contact Information  Primary Emergency Contact: Smart,Mart  Mobile Phone: [phone]  Relation: Spouse  Secondary Emergency Contact: SmartPastora  Mobile Phone: [phone]  Relation: Son    Discharge Plan A: Home, Home with family, Home Health       No Pharmacies Listed    Initial Assessment (most recent)       Adult Discharge Assessment - 01/26/24 1248          Discharge Assessment    Assessment Type Discharge Planning Assessment     Confirmed/corrected address, phone number and insurance Yes     Confirmed Demographics Correct on Facesheet     Source of Information family     People in Home spouse     Do you expect to return to your current living situation? Yes     Prior to hospitilization cognitive status: Alert/Oriented;No Deficits     Current cognitive status: Alert/Oriented;No Deficits     Walking or Climbing Stairs Difficulty yes     Walking or Climbing Stairs ambulation difficulty, requires equipment     Dressing/Bathing Difficulty yes     Dressing/Bathing bathing difficulty, assistance 1 person;bathing difficulty, requires equipment     Equipment Currently Used at Home walker, rolling     Readmission within 30 days? No     Patient currently being followed by outpatient case management? No     Do you currently have service(s) that help you  manage your care at home? No     Do you take prescription medications? Yes     Do you have any problems affording any of your prescribed medications? No     Is the patient taking medications as prescribed? yes     Who is going to help you get home at discharge? Mart Ahumada (Spouse) [phone] (Mobile)     How do you get to doctors appointments? family or friend will provide     Are you on dialysis? No     Discharge Plan A Home;Home with family;Home Health     DME Needed Upon Discharge  none     Discharge Plan discussed with: Patient     Transition of Care Barriers None        Housing Stability    In the last 12 months, was there a time when you were not able to pay the mortgage or rent on time? No     In the last 12 months, was there a time when you did not have a steady place to sleep or slept in a shelter (including now)? No        Transportation Needs    In the past 12 months, has lack of transportation kept you from medical appointments or from getting medications? No     In the past 12 months, has lack of transportation kept you from meetings, work, or from getting things needed for daily living? No        Food Insecurity    Within the past 12 months, you worried that your food would run out before you got the money to buy more. Never true     Within the past 12 months, the food you bought just didn't last and you didn't have money to get more. Never true        OTHER    Name(s) of People in Home Mart Ahumada (Spouse) [phone] (Mobile)                     No future appointments.  BP (!) 157/70 (BP Location: Right arm, Patient Position: Lying)   Pulse 64   Temp 97 °F (36.1 °C) (Axillary)   Resp 18   Ht 5' (1.524 m)   Wt 75.5 kg (166 lb 7.2 oz)   SpO2 96%   BMI 32.51 kg/m²        Medication List        CONTINUE taking these medications      amLODIPine 5 MG tablet  Commonly known as: NORVASC     atorvastatin 80 MG tablet  Commonly known as: LIPITOR     bumetanide 1 MG tablet  Commonly known as: BUMEX      cetirizine 10 MG tablet  Commonly known as: ZYRTEC     ergocalciferol 50,000 unit Cap  Commonly known as: ERGOCALCIFEROL     EScitalopram oxalate 5 MG Tab  Commonly known as: LEXAPRO     febuxostat 40 mg Tab  Commonly known as: ULORIC     hydrALAZINE 25 MG tablet  Commonly known as: APRESOLINE     memantine 10 MG Tab  Commonly known as: NAMENDA     omeprazole 20 MG capsule  Commonly known as: PRILOSEC     pediatric multivitamin chewable tablet     trospium 20 mg Tab tablet  Commonly known as: sanctura            No orders of the defined types were placed in this encounter.               For information on Fall & Injury Prevention, visit: [email or web address] OR  [email or web address] OR  https://www.cdc.gov/steadi/patient.html

## 2024-01-26 NOTE — PLAN OF CARE
[facility] - Telemetry  Discharge Final Note    Primary Care Provider: Minor Farias MD    Expected Discharge Date: 1/26/2024    Pharmacist will go over home medications and reasons for medications. VN and bedside nurse to reiterate final discharge instructions.     Cleared from CM . Bedside Nurse and VN notified.    Pt to DC home with family. HH was current with [facility].  30 day event monitor noted. Arrhythmia clinic to contact pt/family to send device to the home.    Final Discharge Note (most recent)       Final Note - 01/26/24 1419          Final Note    Assessment Type Final Discharge Note (P)      Anticipated Discharge Disposition Home-Health Care Svc (P)      Hospital Resources/Appts/Education Provided Appointments scheduled and added to AVS;Post-Acute resouces added to AVS (P)         Post-Acute Status    Post-Acute Authorization Home Health (P)      Home Health Status Set-up Complete/Auth obtained (P)    Prev with [facility]. Orders faxed. Clinicals sent via [facility].    Discharge Delays None known at this time (P)                    No future appointments.    BP (!) 157/70 (BP Location: Right arm, Patient Position: Lying)   Pulse 64   Temp 97 °F (36.1 °C) (Axillary)   Resp 18   Ht 5' (1.524 m)   Wt 75.5 kg (166 lb 7.2 oz)   SpO2 96%   BMI 32.51 kg/m²       Contact Info       Minor Farias MD   Specialty: Internal Medicine   Relationship: PCP - General    [facility]  [address] [facility] [address]   Phone: [phone]       Next Steps: Go in 1 week(s)    Instructions: FOLLOW UP WITH PCP    [facility]   Specialty: Cardiology    [address] [place] [address] , [address]  [facility] [address]   Phone: [phone]       Next Steps: Call    Instructions: As needed, FOLLOW UP WITH Arrhythmia Clinic [phone]    [facility]   Specialty: Home Health Services    [address] [facility] [address]   Phone:  [phone]       Next Steps: Call    Instructions: As needed, HOME HEALTH, OFFICE TO CALL PATIENT/FAMILY TO SET UP APPT TIME    [facility]   Specialty: Cardiology    [address] [place] [id number]   Phone: [phone]       Next Steps: Go in 1 week(s)    Instructions: FOLLOW UP WITH CARDIOLOGIST AFTER DISCHARGE             Medication List        CONTINUE taking these medications      amLODIPine 5 MG tablet  Commonly known as: NORVASC     atorvastatin 80 MG tablet  Commonly known as: LIPITOR     bumetanide 1 MG tablet  Commonly known as: BUMEX     cetirizine 10 MG tablet  Commonly known as: ZYRTEC     ergocalciferol 50,000 unit Cap  Commonly known as: ERGOCALCIFEROL     EScitalopram oxalate 5 MG Tab  Commonly known as: LEXAPRO     febuxostat 40 mg Tab  Commonly known as: ULORIC     hydrALAZINE 25 MG tablet  Commonly known as: APRESOLINE     memantine 10 MG Tab  Commonly known as: NAMENDA     omeprazole 20 MG capsule  Commonly known as: PRILOSEC     pediatric multivitamin chewable tablet     trospium 20 mg Tab tablet  Commonly known as: sanctura            No orders of the defined types were placed in this encounter.

## 2024-01-26 NOTE — DISCHARGE SUMMARY
[facility] Medicine  Discharge Summary      Patient Name: Kay Ahumada  MRN: [id number]  VERNON: [id number]  Patient Class: IP- Inpatient  Admission Date: 1/25/2024  Hospital Length of Stay: 1 days  Discharge Date and Time: No discharge date for patient encounter.  Attending Physician: Juliano Parson MD   Discharging Provider: Juliano Parson MD  Primary Care Provider: Minor Farias MD    Primary Care Team: Networked reference to record PCT     HPI:   Kay Ahumada is a 78 yr old female with PMH of of CKD, hypertension, type 2 diabetes that presented with generalized weakness fatigue to an outside hospital, found to have significantly elevated troponinemia and transferred to [facility] for cardiology and nephrology consultation.    Patient was noted to have significant troponinemia, and patient was started on heparin drip.  Patient was transitioned to Lovenox.  TTE performed showed preserved LVEF 55-60%.  Patient denies any current chest pain.  EKG with normal sinus rhythm.  Cardiology has recommended angiogram for consideration of HD.     At the outside facility, patient was found to have UTI, and she was started on Rocephin. She has completed 2-3 days. No current complaints of dysuria.    Patient has a functioning left upper extremity fistula.  She was yet to start dialysis with it.    Pt admitted for further workup.    * No surgery found *      Hospital Course:   No notes on file     Goals of Care Treatment Preferences:  Code Status: Full Code      Consults:   Consults (From admission, onward)          Status Ordering Provider     Inpatient consult to [facility] (Cat Desir Nimkevych)  Once        Provider:  (Not yet assigned)    Completed ANDREA ORTIZ     Inpatient consult to [facility]  Once        Provider:  (Not yet assigned)    Completed ANDREA ORTIZ            Cardiac/Vascular  * Elevated troponin level  Patient noted to have significant  "Troponinemia at outside facility.  EKG with normal sinus rhythm  TTE showed LVEF 55-60%    Plan:  Cardiology consultation: "Curtis to be demand in setting of UTI and NADYA on CKD with Cr up to 4 this AM and poorly controlled HTN  Feel no further inpatient work up indicated at this time  Follow up with primary cardiologist "    Patient was discharged home in stable condition      AV block, Mobitz 1    30 day Event Monitor ordered    HTN (hypertension)  Chronic, controlled. Latest blood pressure and vitals reviewed-     Temp:  [96.8 °F (36 °C)-98.1 °F (36.7 °C)]   Pulse:  [60-64]   Resp:  [16-18]   BP: (157-181)/(70-77)   SpO2:  [96 %-99 %] .   Home meds for hypertension were reviewed and noted below.   Hypertension Medications               amLODIPine (NORVASC) 5 MG tablet Take 5 mg by mouth.    bumetanide (BUMEX) 1 MG tablet Take 1 mg by mouth.    hydrALAZINE (APRESOLINE) 25 MG tablet Take 75 mg by mouth.    metoprolol succinate (TOPROL-XL) 50 MG 24 hr tablet Take 2 tablets by mouth once daily.            While in the hospital, will manage blood pressure as follows; Continue home antihypertensive regimen    Will utilize p.r.n. blood pressure medication only if patient's blood pressure greater than 180/110 and she develops symptoms such as worsening chest pain or shortness of breath.    Hyperlipidemia  Continue with patient's statin      Renal/  CKD (chronic kidney disease) stage 5, GFR less than 15 ml/min  Creatine stable for now. BMP reviewed- noted Estimated Creatinine Clearance: 10.4 mL/min (A) (based on SCr of 4 mg/dL (H)). according to latest data. Based on current GFR, CKD stage is stage 5 - GFR < 15.  Monitor UOP and serial BMP and adjust therapy as needed. Renally dose meds. Avoid nephrotoxic medications and procedures.    Baseline Cr 3.3 to 4.0  Recommend follow up with Nephrology as outpatient    Orthopedic  Gout  May continue with the patient's home medications        Final Active Diagnoses:    Diagnosis " Date Noted POA    PRINCIPAL PROBLEM:  Elevated troponin level [R79.89] 01/26/2024 Yes    CKD (chronic kidney disease) stage 5, GFR less than 15 ml/min [N18.5] 01/26/2024 Yes    Hyperlipidemia [E78.5] 01/26/2024 Yes    HTN (hypertension) [I10] 01/26/2024 Yes    Gout [M10.9] 01/26/2024 Yes    AV block, Mobitz 1 [I44.1] 01/26/2024 Unknown      Problems Resolved During this Admission:    Diagnosis Date Noted Date Resolved POA    Dementia [F03.90] 01/26/2024 01/26/2024 Yes       Discharged Condition: good    Disposition:     Follow Up:   Follow-up Information       Minor Farias MD. Go in 1 week(s).    Specialty: Internal Medicine  Why: FOLLOW UP WITH PCP  Contact information:  [address] [facility] [address] [phone]               [facility]. Call.    Specialty: Cardiology  Why: As needed, FOLLOW UP WITH [facility] [phone]  Contact information:  [address] [place] [address] [address], 4th Floor  [facility] [address] [phone]  Additional information:  Please take [address] to the Medical Office Building. Check in on the 4th floor.              [facility]. Call.    Specialty: Home Health Services  Why: As needed, HOME HEALTH, OFFICE TO CALL PATIENT/FAMILY TO SET UP APPT TIME  Contact information:  [address] [place] [address] [phone]               [facility]. Go in 1 week(s).    Specialty: Cardiology  Why: FOLLOW UP WITH CARDIOLOGIST AFTER DISCHARGE  Contact information:  [address] [place] [address] [phone]  Additional information:  Please take [address] to the Medical Office Building. Check in on the 4th floor.                         Patient Instructions:      Cardiac event monitor   Standing Status: Future Standing Exp. Date: 01/26/25     Order Specific Question Answer Comments   Cardiac Event Monitor Auto Trigger    Release to patient Immediate        Significant Diagnostic Studies: Labs: BMP:   Recent Labs    Lab 01/26/24  0247   *      K 4.2      CO2 26   BUN 52*   CREATININE 4.0*   CALCIUM 9.5   , CMP   Recent Labs   Lab 01/26/24  0247      K 4.2      CO2 26   *   BUN 52*   CREATININE 4.0*   CALCIUM 9.5   ANIONGAP 12   , and All labs within the past 24 hours have been reviewed    Pending Diagnostic Studies:       None           Medications:  Reconciled Home Medications:      Medication List        CONTINUE taking these medications      amLODIPine 5 MG tablet  Commonly known as: NORVASC  Take 5 mg by mouth.     atorvastatin 80 MG tablet  Commonly known as: LIPITOR  Take 1 tablet by mouth once daily.     bumetanide 1 MG tablet  Commonly known as: BUMEX  Take 1 mg by mouth.     cetirizine 10 MG tablet  Commonly known as: ZYRTEC  Take 1 tablet by mouth every morning.     ergocalciferol 50,000 unit Cap  Commonly known as: ERGOCALCIFEROL  Take 50,000 Units by mouth.     EScitalopram oxalate 5 MG Tab  Commonly known as: LEXAPRO  Take 1 tablet by mouth once daily.     febuxostat 40 mg Tab  Commonly known as: ULORIC  Take 40 mg by mouth once daily.     hydrALAZINE 25 MG tablet  Commonly known as: APRESOLINE  Take 75 mg by mouth.     memantine 10 MG Tab  Commonly known as: NAMENDA  Take 10 mg by mouth.     omeprazole 20 MG capsule  Commonly known as: PRILOSEC  Take 1 capsule by mouth once daily.     pediatric multivitamin chewable tablet  Take 1 capsule by mouth once daily.     trospium 20 mg Tab tablet  Commonly known as: sanctura  Take 20 mg by mouth 2 (two) times daily.              Indwelling Lines/Drains at time of discharge:   Lines/Drains/Airways       None                   Time spent on the discharge of patient: 35 minutes         Juliano Parson MD  Department of Hospital Medicine  [facility]

## 2024-01-26 NOTE — PROGRESS NOTES
Virtual Nurse:Discharge orders noted; additional clinical references attached.  and pharmacy tech notified.  Patient's discharge instruction packet given by bedside RN.    Cued into patient's room.  Permission received per patient to turn camera to view patient.  Introduced as VN that will be instructing on discharge instructions.  Mart Hernandez at bedside reviewing discharge instructions with VN.  Educated patient on reason for admission; medications to hold, continue, and start, appointment to follow-up with doctor, and when to return to ED. Teach back method used. Verbalized understanding  Number given for 24/7 Nurse Line. Opportunity given for questions and questions answered.  Bedside nurse updated. Transport to [facility] requested.          01/26/24 1641   Shift   Virtual Nurse - Patient Verbalized Approval Of Camera Use;VN Rounding   Type of Frequent Check   Type Patient Rounds   Safety/Activity   Patient Rounds visualized patient

## 2024-01-26 NOTE — PLAN OF CARE
[facility] - Telemetry  Initial Discharge Assessment       Primary Care Provider: Minor Farias MD    Admission Diagnosis: NSTEMI (non-ST elevated myocardial infarction) [I21.4]    Admission Date: 1/25/2024  Expected Discharge Date:     DCA done with pt's spouse. Lives in [place], [place]. Uses RW at home. PCP listed. No Cardiologist at the time. Requires min assist with ADLs. Spouse is caregiver. Son at bedside. Pending medical stability. Agreeable to  visits. [facility].    Transition of Care Barriers: (P) None    Payor: MEDICARE / Plan: MEDICARE PART A & B / Product Type: Government /     Extended Emergency Contact Information  Primary Emergency Contact: [employer]  Mobile Phone: [phone]  Relation: Spouse  Secondary Emergency Contact: [employer]ora  [employer] Phone: [phone]  Relation: Son    Discharge Plan A: Home, Home with family, Home Health       No Pharmacies Listed    Initial Assessment (most recent)       Adult Discharge Assessment - 01/26/24 1248          Discharge Assessment    Assessment Type Discharge Planning Assessment     Confirmed/corrected address, phone number and insurance Yes     Confirmed Demographics Correct on Facesheet     Source of Information family     People in Home spouse     Do you expect to return to your current living situation? Yes     Prior to hospitilization cognitive status: Alert/Oriented;No Deficits     Current cognitive status: Alert/Oriented;No Deficits     Walking or Climbing Stairs Difficulty yes     Walking or Climbing Stairs ambulation difficulty, requires equipment     Dressing/Bathing Difficulty yes     Dressing/Bathing bathing difficulty, assistance 1 person;bathing difficulty, requires equipment     Equipment Currently Used at Home walker, rolling     Readmission within 30 days? No     Patient currently being followed by outpatient case management? No     Do you currently have service(s) that help you manage your care at home? No     Do you take  prescription medications? Yes     Do you have any problems affording any of your prescribed medications? No     Is the patient taking medications as prescribed? yes     Who is going to help you get home at discharge? Mart Ahumada (Spouse) [phone] (Mobile)     How do you get to doctors appointments? family or friend will provide     Are you on dialysis? No     Discharge Plan A Home;Home with family;Home Health     DME Needed Upon Discharge  none (P)      Discharge Plan discussed with: Patient (P)      Transition of Care Barriers None (P)         Housing Stability    In the last 12 months, was there a time when you were not able to pay the mortgage or rent on time? No (P)      In the last 12 months, was there a time when you did not have a steady place to sleep or slept in a shelter (including now)? No (P)         Transportation Needs    In the past 12 months, has lack of transportation kept you from medical appointments or from getting medications? No (P)      In the past 12 months, has lack of transportation kept you from meetings, work, or from getting things needed for daily living? No (P)         Food Insecurity    Within the past 12 months, you worried that your food would run out before you got the money to buy more. Never true (P)      Within the past 12 months, the food you bought just didn't last and you didn't have money to get more. Never true (P)         OTHER    Name(s) of People in Home Mart Ahumada (Spouse) [phone] (Mobile) (P)                    No future appointments.    BP (!) 157/70 (BP Location: Right arm, Patient Position: Lying)   Pulse 64   Temp 97 °F (36.1 °C) (Axillary)   Resp 18   Ht 5' (1.524 m)   Wt 75.5 kg (166 lb 7.2 oz)   SpO2 96%   BMI 32.51 kg/m²      allopurinoL  100 mg Oral Daily    aluminum-magnesium hydroxide-simethicone  30 mL Oral QID (AC & HS)    amLODIPine  5 mg Oral Daily    atorvastatin  80 mg Oral Daily    bumetanide  1 mg Oral Daily    cetirizine  10 mg Oral  QAM    EScitalopram oxalate  5 mg Oral Daily    memantine  10 mg Oral Daily    oxybutynin  10 mg Oral Daily    pantoprazole  40 mg Oral Daily    sucralfate  1 g Oral Q6H      Consults (From admission, onward)          Status Ordering Provider     Inpatient consult to [facility] ([facility] Desir, Tatyana)  Once        Provider:  (Not yet assigned)    Acknowledged ANDREA ORTIZ     [facility]  [facility]        Provider:  (Not yet assigned)    Completed ANDREA ORTIZ

## 2024-01-26 NOTE — HPI
Kay Ahumada is a 78 yr old female with  CKD, hypertension, type 2 diabetes that presented to an outside facility with generalized weakness fatigue on 01/22/2024. Patient also reports CP x 1 month. She is a poor historian and is unable to elaborate. Per  her HS troponin was in the 100s which converts to T of 0.1. EKG was SR without acute ischemic changes. TTE with normal LVEF and no significant WMA. PFO noted without any pulmonary venous flow. Repeat troponin 0.1 this AM. Cr 4 up from 3.5- she has not been declared ESRD. Patient noted to have UTI. Outside records are not available. Patient was transferred to [facility] for cardiology and nephrology consultation. She denies any chest pain at this time.

## 2024-01-26 NOTE — ASSESSMENT & PLAN NOTE
Creatine stable for now. BMP reviewed- noted Estimated Creatinine Clearance: 10.4 mL/min (A) (based on SCr of 4 mg/dL (H)). according to latest data. Based on current GFR, CKD stage is stage 5 - GFR < 15.  Monitor UOP and serial BMP and adjust therapy as needed. Renally dose meds. Avoid nephrotoxic medications and procedures.    Baseline Cr 3.3 to 4.0  Recommend follow up with Nephrology as outpatient

## 2024-01-26 NOTE — H&P
[facility] Medicine  History & Physical    Patient Name: Kay Ahumada  MRN: [id number]  Patient Class: IP- Inpatient  Admission Date: 1/25/2024  Attending Physician: Juliano Parson MD   Primary Care Provider: Juliano Parson MD         Patient information was obtained from patient and ER records.     Subjective:     Principal Problem:Elevated troponin level    Chief Complaint:   Chief Complaint   Patient presents with    troponemia        HPI: Kay Ahumada is a 78 yr old female with PMH of of CKD, hypertension, type 2 diabetes that presented with generalized weakness fatigue to an outside hospital, found to have significantly elevated troponinemia and transferred to [facility] for cardiology and nephrology consultation.    Patient was noted to have significant troponinemia, and patient was started on heparin drip.  Patient was transitioned to Lovenox.  TTE performed showed preserved LVEF 55-60%.  Patient denies any current chest pain.  EKG with normal sinus rhythm.  Cardiology has recommended angiogram for consideration of HD.     At the outside facility, patient was found to have UTI, and she was started on Rocephin. She has completed 2-3 days. No current complaints of dysuria.    Patient has a functioning left upper extremity fistula.  She was yet to start dialysis with it.    Pt admitted for further workup.    No past medical history on file.    No past surgical history on file.    Review of patient's allergies indicates:   Allergen Reactions    Amoxicillin Rash    Ferrous sulfate Other (See Comments)     Constipation       No current facility-administered medications on file prior to encounter.     Current Outpatient Medications on File Prior to Encounter   Medication Sig    amLODIPine (NORVASC) 5 MG tablet Take 5 mg by mouth.    atorvastatin (LIPITOR) 80 MG tablet Take 1 tablet by mouth once daily.    bumetanide (BUMEX) 1 MG tablet Take 1 mg by mouth.    cetirizine (ZYRTEC) 10 MG tablet  Take 1 tablet by mouth every morning.    ergocalciferol (ERGOCALCIFEROL) 50,000 unit Cap Take 50,000 Units by mouth.    EScitalopram oxalate (LEXAPRO) 5 MG Tab Take 1 tablet by mouth once daily.    febuxostat (ULORIC) 40 mg Tab Take 40 mg by mouth once daily.    hydrALAZINE (APRESOLINE) 25 MG tablet Take 75 mg by mouth.    memantine (NAMENDA) 10 MG Tab Take 10 mg by mouth.    metoprolol succinate (TOPROL-XL) 50 MG 24 hr tablet Take 2 tablets by mouth once daily.    omeprazole (PRILOSEC) 20 MG capsule Take 1 capsule by mouth once daily.    pediatric multivitamin chewable tablet Take 1 capsule by mouth once daily.    trospium (SANCTURA) 20 mg Tab tablet Take 20 mg by mouth 2 (two) times daily.     Family History    None       Tobacco Use    Smoking status: Not on file    Smokeless tobacco: Not on file   Substance and Sexual Activity    Alcohol use: Not on file    Drug use: Not on file    Sexual activity: Not on file     Review of Systems   Constitutional:  Negative for appetite change and chills.   HENT:  Negative for ear discharge and ear pain.    Respiratory:  Negative for cough and choking.    Cardiovascular:  Negative for chest pain and leg swelling.   Gastrointestinal:  Negative for anal bleeding and blood in stool.   Endocrine: Negative for polydipsia and polyphagia.   Genitourinary:  Negative for flank pain and hematuria.   Musculoskeletal:  Negative for back pain and gait problem.   Skin:  Negative for pallor and rash.   Allergic/Immunologic: Negative for food allergies and immunocompromised state.   Neurological:  Negative for seizures and speech difficulty.   Hematological:  Negative for adenopathy. Does not bruise/bleed easily.   Psychiatric/Behavioral:  Negative for decreased concentration and dysphoric mood.      Objective:     Vital Signs (Most Recent):  Temp: 97.5 °F (36.4 °C) (01/25/24 2225)  Pulse: 63 (01/25/24 2358)  Resp: 16 (01/25/24 2225)  BP: (!) 168/71 (01/25/24 2225)  SpO2: 98 % (01/25/24 2225)  "Vital Signs (24h Range):  Temp:  [97.5 °F (36.4 °C)] 97.5 °F (36.4 °C)  Pulse:  [62-63] 63  Resp:  [16] 16  SpO2:  [98 %] 98 %  BP: (168)/(71) 168/71     Weight: 74.4 kg (164 lb 0.4 oz)  Body mass index is 32.03 kg/m².     Physical Exam  Vitals reviewed.   Constitutional:       General: She is not in acute distress.     Appearance: She is obese. She is not toxic-appearing.   HENT:      Right Ear: There is no impacted cerumen.      Left Ear: There is no impacted cerumen.      Nose: No congestion or rhinorrhea.      Mouth/Throat:      Pharynx: No oropharyngeal exudate or posterior oropharyngeal erythema.   Eyes:      General:         Right eye: No discharge.         Left eye: No discharge.   Cardiovascular:      Rate and Rhythm: Normal rate.      Heart sounds: No murmur heard.     No gallop.   Pulmonary:      Breath sounds: No wheezing or rales.   Abdominal:      General: There is no distension.      Tenderness: There is no abdominal tenderness.   Musculoskeletal:         General: No swelling or deformity.      Cervical back: No rigidity.      Comments: Fistula seen    Lymphadenopathy:      Cervical: No cervical adenopathy.   Skin:     Coloration: Skin is not jaundiced.      Findings: No bruising.   Neurological:      General: No focal deficit present.      Cranial Nerves: No cranial nerve deficit.      Motor: No weakness.   Psychiatric:         Mood and Affect: Mood normal.                Significant Labs: All pertinent labs within the past 24 hours have been reviewed.  BMP: No results for input(s): "GLU", "NA", "K", "CL", "CO2", "BUN", "CREATININE", "CALCIUM", "MG" in the last 48 hours.  CBC: No results for input(s): "WBC", "HGB", "HCT", "PLT" in the last 48 hours.  CMP: No results for input(s): "NA", "K", "CL", "CO2", "GLU", "BUN", "CREATININE", "CALCIUM", "PROT", "ALBUMIN", "BILITOT", "ALKPHOS", "AST", "ALT", "ANIONGAP", "EGFRNONAA" in the last 48 hours.    Invalid input(s): "ESTGFAFRICA"  Troponin: No results for " "input(s): "TROPONINI", "TROPONINIHS" in the last 48 hours.  TSH:   Recent Labs   Lab 09/20/23  1451   TSH 0.85     Urine Culture: No results for input(s): "LABURIN" in the last 48 hours.    Significant Imaging: I have reviewed all pertinent imaging results/findings within the past 24 hours.  I have reviewed and interpreted all pertinent imaging results/findings within the past 24 hours.  Assessment/Plan:     * Elevated troponin level  Patient noted to have significant Troponinemia at outside facility.  EKG with normal sinus rhythm  TTE showed LVEF 55-60%    Plan:  Cardiology consultation   NPO   Possible angiogram in the a.m.    Gout  May continue with the patient's home medications      HTN (hypertension)  Chronic, controlled. Latest blood pressure and vitals reviewed-     Temp:  [97.5 °F (36.4 °C)]   Pulse:  [62-63]   Resp:  [16]   BP: (168)/(71)   SpO2:  [98 %] .   Home meds for hypertension were reviewed and noted below.   Hypertension Medications               amLODIPine (NORVASC) 5 MG tablet Take 5 mg by mouth.    bumetanide (BUMEX) 1 MG tablet Take 1 mg by mouth.    hydrALAZINE (APRESOLINE) 25 MG tablet Take 75 mg by mouth.    metoprolol succinate (TOPROL-XL) 50 MG 24 hr tablet Take 2 tablets by mouth once daily.            While in the hospital, will manage blood pressure as follows; Continue home antihypertensive regimen    Will utilize p.r.n. blood pressure medication only if patient's blood pressure greater than 180/110 and she develops symptoms such as worsening chest pain or shortness of breath.    Hyperlipidemia  Continue with patient's statin      CKD (chronic kidney disease) stage 5, GFR less than 15 ml/min  Creatine stable for now. BMP reviewed- noted CrCl cannot be calculated (Patient's most recent lab result is older than the maximum 7 days allowed.). according to latest data. Based on current GFR, CKD stage is stage 5 - GFR < 15.  Monitor UOP and serial BMP and adjust therapy as needed. Renally " dose meds. Avoid nephrotoxic medications and procedures.      VTE Risk Mitigation (From admission, onward)           Ordered     IP VTE LOW RISK PATIENT  Once         01/25/24 2203     Place sequential compression device  Until discontinued         01/25/24 2203                                    Bruce Rivera MD  Department of [facility] Medicine  [facility]

## 2024-02-12 NOTE — PHYSICIAN QUERY
"PT Name: Kay Ahumada  MR #: [id number]     DOCUMENTATION CLARIFICATION      CDS/: Ibis Briones RN             Contact information: [email or web address]    This form is a permanent document in the medical record.    Query Date: February 12, 2024    By submitting this query, we are merely seeking further clarification of documentation. Please utilize your independent clinical judgment when addressing the question(s) below.     The Medical Record contains the following:   Indicators   Supporting Clinical Findings Location in Medical Record    Chest Pain, Angina      Coronary Artery Disease      EKG      Troponin      Echo Results      Angiography     x Documentation of acute cardiac condition * Elevated troponin level  Troponin 0.1 x 2- flat non ACS trend  EKG SR without acute ischemic changes  TTE with normal LVEF and no WMA  No CP  [place] to be demand in setting of UTI and NADYA on CKD with Cr up to 4 this AM and poorly controlled HTN  Feel no further inpatient work up indicated at this time  Follow up with primary cardiologist      * Elevated troponin level  Patient noted to have significant Troponinemia at outside facility.  EKG with normal sinus rhythm  TTE showed LVEF 55-60%     Plan:      Cardiology consultation: "[place] to be demand in setting of UTI and NADYA on CKD with Cr up to 4 this AM and poorly controlled HTN  Feel no further inpatient work up indicated at this time  Follow up with primary cardiologist " Consult 1/26       Cardio                    DC summary 1/26       Hosp [facility]    Medication/Treatment      Other        Provider, please clarify the known or suspected Cardiac Condition diagnosis related to the above documentation:    [  x ] Demand Ischemia without Acute Myocardial Infarction     [   ] Elevated troponin only (without corresponding diagnosis)     [   ] Other Cardiac Diagnosis (please specify): _______________           Please document in your progress notes daily for the duration of " treatment until resolved, and include in your discharge summary.    Form No. [id number]